# Patient Record
Sex: MALE | Race: WHITE | NOT HISPANIC OR LATINO | URBAN - METROPOLITAN AREA
[De-identification: names, ages, dates, MRNs, and addresses within clinical notes are randomized per-mention and may not be internally consistent; named-entity substitution may affect disease eponyms.]

---

## 2016-12-31 NOTE — ED ADULT TRIAGE NOTE - CHIEF COMPLAINT QUOTE
Pt c/o syncopal episode sp "feeling a weird sensation in his groin area & SOB." States he hit the L side of his head on the floor with unknown LOC - found by mom on the ground. Mild lethargy & hematoma noted to pt's scalp. Family also states incontinence.

## 2016-12-31 NOTE — ED ADULT NURSE NOTE - OBJECTIVE STATEMENT
22 y/o male presents to ED s/p syncopal episode.  Pt states that he has been having "tingling" in his genitals all day and felt a pain in his stomach PTA then woke up on the floor with urinary incontinence.  Pt's mother states that she heard him fall and found him on the floor with his eyes rolled back into his head NO shaking of extremities per mother and when she called his name he was able to focus on her but confused.  Pt arrived in ED awake alert in NAD, c/o slight headache4 equally.  Pt denies drug or alcohol use, states that he does take xanax occasionally last time was 5 days ago and months ago prior to that.  Pt denies daily use.  Pt states that he takes work out supplements daily.

## 2017-01-01 NOTE — ED PROVIDER NOTE - PLAN OF CARE
You were seen today after your faint.  Syncope is very common. About 1 out of every 3 people has it at some point in life. In many cases, syncope is nothing to worry about.  Syncope happens when the brain temporarily doesn't get enough blood. One of the most common reasons this happens is called "vasovagal syncope." If you have vasovagal syncope, your body has a reaction in which your heart beats too slowly or your blood vessels expand (or both). This can happen for lots of different kinds of reasons. People can have vasovagal syncope if they: have stress from pain or fear, stand too long, are tired, or occasionally from urinating, coughing, or other bodily functions.  See your primary care physician in 2-3 days for re-evaluation.  RETURN TO THE EMERGENCY DEPARTMENT IMMEDIATELY IF YOU DEVELOP CHEST PAIN, RACING HEART, TROUBLE BREATHING, OR FOR ANY OTHER CONCERN.

## 2017-01-01 NOTE — ED PROVIDER NOTE - CHPI ED SYMPTOMS NEG
no nausea/no vomiting/no blurred vision/no weakness/no photophobia/no change in level of consciousness

## 2017-01-01 NOTE — ED PROVIDER NOTE - CARE PLAN
Principal Discharge DX:	Syncope, unspecified syncope type Principal Discharge DX:	Syncope, unspecified syncope type  Instructions for follow-up, activity and diet:	You were seen today after your faint.  Syncope is very common. About 1 out of every 3 people has it at some point in life. In many cases, syncope is nothing to worry about.  Syncope happens when the brain temporarily doesn't get enough blood. One of the most common reasons this happens is called "vasovagal syncope." If you have vasovagal syncope, your body has a reaction in which your heart beats too slowly or your blood vessels expand (or both). This can happen for lots of different kinds of reasons. People can have vasovagal syncope if they: have stress from pain or fear, stand too long, are tired, or occasionally from urinating, coughing, or other bodily functions.  See your primary care physician in 2-3 days for re-evaluation.  RETURN TO THE EMERGENCY DEPARTMENT IMMEDIATELY IF YOU DEVELOP CHEST PAIN, RACING HEART, TROUBLE BREATHING, OR FOR ANY OTHER CONCERN.

## 2017-01-01 NOTE — ED PROVIDER NOTE - MEDICAL DECISION MAKING DETAILS
22 y/o male s/p LoC.  More likely syncope as prodrome, no seizure-like activity, no post-ictal confusion.  Did have urinary incontinence and some mild head trauma.  EKG shows no abnormalities to suggest cardiac etiology.  Will check cbc, cmp, ua, ct head.

## 2017-01-01 NOTE — ED PROVIDER NOTE - OBJECTIVE STATEMENT
Pt is a 22 y/o male presenting after syncopal episode.  Per pt he woke up this am, had some dysuria throughout the day.  Was reading about dysuria on his smartphone, becoming anxious, began to feel lightheaded, walked towards his parents in the living room for help and woke up on the floor.  Per mother, she heard a thud and found him on the floor, no seizure-like activity.  He came to on his own after a few minutes, transiently disoriented for less than a minute, then back to baseline ms.  +urinary incont.  Recent URI over past two weeks gradually improving.  Mild ha at left occiput where he struck his head on the floor.  Denies f/c, neck pain, vision changes, numbness/tingling/weakness to arms/legs, gait abn.

## 2018-05-21 NOTE — ED ADULT NURSE REASSESSMENT NOTE - NS ED NURSE REASSESS COMMENT FT1
Pt remains A&Ox3, denies dizziness, lightheadedness, n/v, HA at this time. Pt ambulating back and forth to bathroom with steady gait noted. Md at bedside reviewing d/c paperwork with pt and family, denies any questions or concerns at this time. IV d/c and ambulatory on d/c with family
alert

## 2019-04-02 ENCOUNTER — EMERGENCY (EMERGENCY)
Facility: HOSPITAL | Age: 24
LOS: 0 days | Discharge: HOME | End: 2019-04-02
Attending: EMERGENCY MEDICINE
Payer: COMMERCIAL

## 2019-04-02 VITALS
TEMPERATURE: 98 F | WEIGHT: 244.93 LBS | OXYGEN SATURATION: 98 % | RESPIRATION RATE: 18 BRPM | HEART RATE: 80 BPM | HEIGHT: 74 IN | DIASTOLIC BLOOD PRESSURE: 68 MMHG | SYSTOLIC BLOOD PRESSURE: 109 MMHG

## 2019-04-02 VITALS — TEMPERATURE: 98 F

## 2019-04-02 DIAGNOSIS — Z88.0 ALLERGY STATUS TO PENICILLIN: ICD-10-CM

## 2019-04-02 DIAGNOSIS — R10.9 UNSPECIFIED ABDOMINAL PAIN: ICD-10-CM

## 2019-04-02 DIAGNOSIS — R10.32 LEFT LOWER QUADRANT PAIN: ICD-10-CM

## 2019-04-02 DIAGNOSIS — R10.12 LEFT UPPER QUADRANT PAIN: ICD-10-CM

## 2019-04-02 LAB
ALBUMIN SERPL ELPH-MCNC: 4 G/DL — SIGNIFICANT CHANGE UP (ref 3.3–5)
ALP SERPL-CCNC: 67 U/L — SIGNIFICANT CHANGE UP (ref 40–120)
ALT FLD-CCNC: 33 U/L — SIGNIFICANT CHANGE UP (ref 12–78)
AMYLASE P1 CFR SERPL: 64 U/L — SIGNIFICANT CHANGE UP (ref 25–115)
ANION GAP SERPL CALC-SCNC: 8 MMOL/L — SIGNIFICANT CHANGE UP (ref 5–17)
APTT BLD: 35.2 SEC — SIGNIFICANT CHANGE UP (ref 28.5–37)
AST SERPL-CCNC: 25 U/L — SIGNIFICANT CHANGE UP (ref 15–37)
BASOPHILS # BLD AUTO: 0 K/UL — SIGNIFICANT CHANGE UP (ref 0–0.2)
BASOPHILS NFR BLD AUTO: 0 % — SIGNIFICANT CHANGE UP (ref 0–2)
BILIRUB SERPL-MCNC: 0.6 MG/DL — SIGNIFICANT CHANGE UP (ref 0.2–1.2)
BUN SERPL-MCNC: 10 MG/DL — SIGNIFICANT CHANGE UP (ref 7–23)
CALCIUM SERPL-MCNC: 9.2 MG/DL — SIGNIFICANT CHANGE UP (ref 8.5–10.1)
CHLORIDE SERPL-SCNC: 107 MMOL/L — SIGNIFICANT CHANGE UP (ref 96–108)
CO2 SERPL-SCNC: 25 MMOL/L — SIGNIFICANT CHANGE UP (ref 22–31)
CREAT SERPL-MCNC: 1.09 MG/DL — SIGNIFICANT CHANGE UP (ref 0.5–1.3)
EOSINOPHIL # BLD AUTO: 7.02 K/UL — HIGH (ref 0–0.5)
EOSINOPHIL NFR BLD AUTO: 46 % — HIGH (ref 0–6)
GLUCOSE SERPL-MCNC: 99 MG/DL — SIGNIFICANT CHANGE UP (ref 70–99)
HCT VFR BLD CALC: 49.4 % — SIGNIFICANT CHANGE UP (ref 39–50)
HGB BLD-MCNC: 17 G/DL — SIGNIFICANT CHANGE UP (ref 13–17)
INR BLD: 1.23 RATIO — HIGH (ref 0.88–1.16)
LIDOCAIN IGE QN: 122 U/L — SIGNIFICANT CHANGE UP (ref 73–393)
LYMPHOCYTES # BLD AUTO: 22 % — SIGNIFICANT CHANGE UP (ref 13–44)
LYMPHOCYTES # BLD AUTO: 3.36 K/UL — HIGH (ref 1–3.3)
MANUAL SMEAR VERIFICATION: SIGNIFICANT CHANGE UP
MCHC RBC-ENTMCNC: 29.2 PG — SIGNIFICANT CHANGE UP (ref 27–34)
MCHC RBC-ENTMCNC: 34.4 GM/DL — SIGNIFICANT CHANGE UP (ref 32–36)
MCV RBC AUTO: 84.7 FL — SIGNIFICANT CHANGE UP (ref 80–100)
MONOCYTES # BLD AUTO: 0.31 K/UL — SIGNIFICANT CHANGE UP (ref 0–0.9)
MONOCYTES NFR BLD AUTO: 2 % — SIGNIFICANT CHANGE UP (ref 2–14)
NEUTROPHILS # BLD AUTO: 4.58 K/UL — SIGNIFICANT CHANGE UP (ref 1.8–7.4)
NEUTROPHILS NFR BLD AUTO: 29 % — LOW (ref 43–77)
NEUTS BAND # BLD: 1 % — SIGNIFICANT CHANGE UP (ref 0–8)
NRBC # BLD: 0 /100 — SIGNIFICANT CHANGE UP (ref 0–0)
NRBC # BLD: SIGNIFICANT CHANGE UP /100 WBCS (ref 0–0)
PLAT MORPH BLD: NORMAL — SIGNIFICANT CHANGE UP
PLATELET # BLD AUTO: 331 K/UL — SIGNIFICANT CHANGE UP (ref 150–400)
POTASSIUM SERPL-MCNC: 4.1 MMOL/L — SIGNIFICANT CHANGE UP (ref 3.5–5.3)
POTASSIUM SERPL-SCNC: 4.1 MMOL/L — SIGNIFICANT CHANGE UP (ref 3.5–5.3)
PROT SERPL-MCNC: 7.9 GM/DL — SIGNIFICANT CHANGE UP (ref 6–8.3)
PROTHROM AB SERPL-ACNC: 13.9 SEC — HIGH (ref 10–12.9)
RBC # BLD: 5.83 M/UL — HIGH (ref 4.2–5.8)
RBC # FLD: 13 % — SIGNIFICANT CHANGE UP (ref 10.3–14.5)
RBC BLD AUTO: NORMAL — SIGNIFICANT CHANGE UP
SODIUM SERPL-SCNC: 140 MMOL/L — SIGNIFICANT CHANGE UP (ref 135–145)
WBC # BLD: 15.26 K/UL — HIGH (ref 3.8–10.5)
WBC # FLD AUTO: 15.26 K/UL — HIGH (ref 3.8–10.5)

## 2019-04-02 PROCEDURE — 74177 CT ABD & PELVIS W/CONTRAST: CPT | Mod: 26

## 2019-04-02 PROCEDURE — 71045 X-RAY EXAM CHEST 1 VIEW: CPT | Mod: 26

## 2019-04-02 PROCEDURE — 99285 EMERGENCY DEPT VISIT HI MDM: CPT

## 2019-04-02 RX ORDER — MORPHINE SULFATE 50 MG/1
4 CAPSULE, EXTENDED RELEASE ORAL ONCE
Qty: 0 | Refills: 0 | Status: DISCONTINUED | OUTPATIENT
Start: 2019-04-02 | End: 2019-04-02

## 2019-04-02 RX ORDER — OMEPRAZOLE 10 MG/1
1 CAPSULE, DELAYED RELEASE ORAL
Qty: 0 | Refills: 0 | COMMUNITY

## 2019-04-02 RX ORDER — HYDROMORPHONE HYDROCHLORIDE 2 MG/ML
0.5 INJECTION INTRAMUSCULAR; INTRAVENOUS; SUBCUTANEOUS ONCE
Qty: 0 | Refills: 0 | Status: DISCONTINUED | OUTPATIENT
Start: 2019-04-02 | End: 2019-04-02

## 2019-04-02 RX ORDER — ONDANSETRON 8 MG/1
4 TABLET, FILM COATED ORAL ONCE
Qty: 0 | Refills: 0 | Status: COMPLETED | OUTPATIENT
Start: 2019-04-02 | End: 2019-04-02

## 2019-04-02 RX ORDER — SODIUM CHLORIDE 9 MG/ML
2000 INJECTION INTRAMUSCULAR; INTRAVENOUS; SUBCUTANEOUS ONCE
Qty: 0 | Refills: 0 | Status: COMPLETED | OUTPATIENT
Start: 2019-04-02 | End: 2019-04-02

## 2019-04-02 RX ORDER — PANTOPRAZOLE SODIUM 20 MG/1
40 TABLET, DELAYED RELEASE ORAL ONCE
Qty: 0 | Refills: 0 | Status: COMPLETED | OUTPATIENT
Start: 2019-04-02 | End: 2019-04-02

## 2019-04-02 RX ORDER — IOHEXOL 300 MG/ML
30 INJECTION, SOLUTION INTRAVENOUS ONCE
Qty: 0 | Refills: 0 | Status: COMPLETED | OUTPATIENT
Start: 2019-04-02 | End: 2019-04-02

## 2019-04-02 RX ADMIN — MORPHINE SULFATE 4 MILLIGRAM(S): 50 CAPSULE, EXTENDED RELEASE ORAL at 18:04

## 2019-04-02 RX ADMIN — SODIUM CHLORIDE 2000 MILLILITER(S): 9 INJECTION INTRAMUSCULAR; INTRAVENOUS; SUBCUTANEOUS at 17:41

## 2019-04-02 RX ADMIN — IOHEXOL 30 MILLILITER(S): 300 INJECTION, SOLUTION INTRAVENOUS at 17:43

## 2019-04-02 RX ADMIN — MORPHINE SULFATE 4 MILLIGRAM(S): 50 CAPSULE, EXTENDED RELEASE ORAL at 19:41

## 2019-04-02 RX ADMIN — PANTOPRAZOLE SODIUM 40 MILLIGRAM(S): 20 TABLET, DELAYED RELEASE ORAL at 17:40

## 2019-04-02 RX ADMIN — HYDROMORPHONE HYDROCHLORIDE 0.5 MILLIGRAM(S): 2 INJECTION INTRAMUSCULAR; INTRAVENOUS; SUBCUTANEOUS at 20:45

## 2019-04-02 RX ADMIN — ONDANSETRON 4 MILLIGRAM(S): 8 TABLET, FILM COATED ORAL at 19:41

## 2019-04-02 RX ADMIN — ONDANSETRON 4 MILLIGRAM(S): 8 TABLET, FILM COATED ORAL at 17:40

## 2019-04-02 NOTE — ED PROVIDER NOTE - PROGRESS NOTE DETAILS
Justin: patient signed out by Dr. Guerra pending CT and re-eval. CT reviewed and discussed with patient. Patient received pain mgmt. He currently feels well and wants to go home. Patient will call Dr. Dawkins in AM for f/up. d/c with care instructions.

## 2019-04-02 NOTE — ED ADULT TRIAGE NOTE - CHIEF COMPLAINT QUOTE
c/o luq pain since last wednesday worse since last night seen at [facility] yesterday and rx'd carafate and dexilant started meds last night no improvement c/o nausea denies vomiting states diarrhea last week now regular consistency stools

## 2019-04-02 NOTE — ED PROVIDER NOTE - OBJECTIVE STATEMENT
23 years old male walked in with mom c/o left upper abd pain nausea diarrhea for one week Pt was seen by Dr. Dawkins gastroenterologist couple of days and and started carafate and dexilant and scheduled for upper endoscopy in 3 wks. Pt sts the diarrhea resolved but the pain worsen since last night. Pt denies recent hx of trauma, headache, dizziness, blurred visions, light sensitivities, cough, sob, chest pain, fever, chills, dysuria.

## 2019-04-02 NOTE — ED ADULT NURSE NOTE - OBJECTIVE STATEMENT
Patient comes in complaining of LUQ abdominal pain, with N/V associated and diarrhea with a foul smell. PMH: Acid Reflux

## 2019-04-02 NOTE — ED PROVIDER NOTE - CONSTITUTIONAL, MLM
normal... Well appearing, well nourished, awake, alert, oriented to person, place, time/situation and in no apparent distress. Speaking in clear full sentences no nasal flaring no shoulders retractions no diaphoresis, not holding his head/chest/abdomen.

## 2019-04-02 NOTE — ED ADULT NURSE NOTE - ED STAT RN HANDOFF DETAILS
Patient lying comfortably on stretcher. No signs of distressed noted. Mother by the bedside. Handoff given to STAS Land.

## 2020-04-25 ENCOUNTER — MESSAGE (OUTPATIENT)
Age: 25
End: 2020-04-25

## 2021-11-11 PROBLEM — K21.9 GASTRO-ESOPHAGEAL REFLUX DISEASE WITHOUT ESOPHAGITIS: Chronic | Status: ACTIVE | Noted: 2019-04-02

## 2022-01-05 ENCOUNTER — APPOINTMENT (OUTPATIENT)
Dept: ENDOCRINOLOGY | Facility: CLINIC | Age: 27
End: 2022-01-05
Payer: COMMERCIAL

## 2022-01-05 VITALS
BODY MASS INDEX: 30.8 KG/M2 | DIASTOLIC BLOOD PRESSURE: 87 MMHG | HEART RATE: 79 BPM | SYSTOLIC BLOOD PRESSURE: 139 MMHG | OXYGEN SATURATION: 99 % | HEIGHT: 74 IN | WEIGHT: 240 LBS

## 2022-01-05 PROCEDURE — 99204 OFFICE O/P NEW MOD 45 MIN: CPT | Mod: 25

## 2022-01-08 ENCOUNTER — LABORATORY RESULT (OUTPATIENT)
Age: 27
End: 2022-01-08

## 2022-02-05 ENCOUNTER — LABORATORY RESULT (OUTPATIENT)
Age: 27
End: 2022-02-05

## 2022-02-09 LAB
MONOMERIC PROLACTIN (ICMA)*: 15.2 NG/ML
PERCENT MACROPROLACTIN: 22 %
PROLACTIN SERPL-MCNC: 19.5 NG/ML
PROLACTIN, SERUM (ICMA)*: 19.5 NG/ML

## 2022-02-22 ENCOUNTER — APPOINTMENT (OUTPATIENT)
Dept: MRI IMAGING | Facility: CLINIC | Age: 27
End: 2022-02-22
Payer: COMMERCIAL

## 2022-02-22 ENCOUNTER — OUTPATIENT (OUTPATIENT)
Dept: OUTPATIENT SERVICES | Facility: HOSPITAL | Age: 27
LOS: 1 days | End: 2022-02-22
Payer: COMMERCIAL

## 2022-02-22 DIAGNOSIS — R79.89 OTHER SPECIFIED ABNORMAL FINDINGS OF BLOOD CHEMISTRY: ICD-10-CM

## 2022-02-22 PROCEDURE — A9585: CPT

## 2022-02-22 PROCEDURE — 70553 MRI BRAIN STEM W/O & W/DYE: CPT | Mod: 26

## 2022-02-22 PROCEDURE — 70553 MRI BRAIN STEM W/O & W/DYE: CPT

## 2022-04-14 ENCOUNTER — OUTPATIENT (OUTPATIENT)
Dept: OUTPATIENT SERVICES | Facility: HOSPITAL | Age: 27
LOS: 1 days | End: 2022-04-14
Payer: COMMERCIAL

## 2022-04-14 ENCOUNTER — APPOINTMENT (OUTPATIENT)
Dept: MRI IMAGING | Facility: CLINIC | Age: 27
End: 2022-04-14
Payer: COMMERCIAL

## 2022-04-14 DIAGNOSIS — Z00.00 ENCOUNTER FOR GENERAL ADULT MEDICAL EXAMINATION WITHOUT ABNORMAL FINDINGS: ICD-10-CM

## 2022-04-14 PROCEDURE — 72148 MRI LUMBAR SPINE W/O DYE: CPT | Mod: 26

## 2022-04-14 PROCEDURE — 72148 MRI LUMBAR SPINE W/O DYE: CPT

## 2022-04-20 ENCOUNTER — APPOINTMENT (OUTPATIENT)
Dept: ENDOCRINOLOGY | Facility: CLINIC | Age: 27
End: 2022-04-20
Payer: COMMERCIAL

## 2022-04-20 VITALS
DIASTOLIC BLOOD PRESSURE: 70 MMHG | HEIGHT: 74 IN | OXYGEN SATURATION: 98 % | SYSTOLIC BLOOD PRESSURE: 118 MMHG | BODY MASS INDEX: 30.03 KG/M2 | TEMPERATURE: 98.4 F | WEIGHT: 234 LBS | HEART RATE: 91 BPM

## 2022-04-20 DIAGNOSIS — R79.89 OTHER SPECIFIED ABNORMAL FINDINGS OF BLOOD CHEMISTRY: ICD-10-CM

## 2022-04-20 PROCEDURE — 99215 OFFICE O/P EST HI 40 MIN: CPT

## 2022-04-20 NOTE — HISTORY OF PRESENT ILLNESS
[FreeTextEntry1] : 26 year  Male referred for low testosterone levels. \par Mr Yovany reports a history of scoliosis, herniated discs at age 21 likely due to lifting weights (gets trigger point every month and epidural steroid shots 3x yearly), last injection past summer. He did notice some weight gain (about 20 lbs since stopping working out several years ago), fatigue. His erections are fine, but spontaneous am erections are somewhat decreased. Shaving is well preserved, but "hair growth was never great". Libido is preserved. On and off headaches, but denies visual disturbances, dizziness, nausea, vomiting, abdominal pain,  breast discharge, worsening in peripheral vision or excessive snoring. Sedentary job. \par He underwent puberty at a normal rate compared with his peers. No history of learning disabilities or behavioral disorders. No history of insults to the brain or testes, including surgery, trauma, tumors, or radiation exposure. \par No history of diabetes, HIV, liver disease, or kidney disease. No history of medication use including anabolic steroids, glucocorticoids, chronic pain medications, or history of chemotherapy. His sense of smell is intact. \par Family history is negative for infertility problems and low testosterone. Currently he is in monogamous relationship with one partner. No issues with ejaculation.  \par Labs: prolactin- 19.5 <-- 24.1, but monomeric is 15.2. \par Testo- 222, free T- 9.1, SHBG- 14.5. LH/FSH- 8.8/3.6\par hcg < 1\par estradiol- 9\par TSH- 1.4\par Pit MRI (2/22/22)-  normal\par

## 2022-04-20 NOTE — ASSESSMENT
[FreeTextEntry1] : 1.  Hypogonadism.\par Discussed with the patient in details current approaches to hypogonadism management. \par It is likely multifactorial in etiology , including prior therapy with steroid injections, weight gain.\par -His free testosterone was low normal, and total testosterone was decreased due to low SHBG.\par - will repeat fasting testosterone, gonadotropins, prolactin.\par - R+B of testosterone replacement reviewed in details, including worsening of NII and potential adverse effects on CV system, effect on hematocrit, PSA, and liver functions, as well as decrease in sperm count.\par - I reviewed with the patient topical vs injectable vs implantable testosterone preparations, as well as proper use/applications/precautions and monitoring.\par - Potential options for a short term use of Clomid reviewed as well.\par \par 2.  Mild hyperprolactinemia with normal pituitary MRI.\par – Previous monomeric prolactin is in normal range.  We will repeat fasting prolactin, monomeric prolactin, diluted prolactin levels.\par -Trial of the dopamine agonist can be also considered\par \par We will review the results over the phone and decide on further management

## 2022-04-20 NOTE — REASON FOR VISIT
[Consultation] : a consultation visit [Hypogonadism] : hypogonadism [Pituitary Evaluation/ Disorder] : pituitary evaluation/disorder

## 2022-05-06 ENCOUNTER — APPOINTMENT (OUTPATIENT)
Dept: ENDOCRINOLOGY | Facility: CLINIC | Age: 27
End: 2022-05-06

## 2022-06-23 ENCOUNTER — APPOINTMENT (OUTPATIENT)
Dept: CT IMAGING | Facility: CLINIC | Age: 27
End: 2022-06-23

## 2022-06-23 ENCOUNTER — OUTPATIENT (OUTPATIENT)
Dept: OUTPATIENT SERVICES | Facility: HOSPITAL | Age: 27
LOS: 1 days | End: 2022-06-23
Payer: COMMERCIAL

## 2022-06-23 DIAGNOSIS — Z00.00 ENCOUNTER FOR GENERAL ADULT MEDICAL EXAMINATION WITHOUT ABNORMAL FINDINGS: ICD-10-CM

## 2022-06-23 DIAGNOSIS — J32.8 OTHER CHRONIC SINUSITIS: ICD-10-CM

## 2022-06-23 PROCEDURE — 70486 CT MAXILLOFACIAL W/O DYE: CPT | Mod: 26

## 2022-06-23 PROCEDURE — 70486 CT MAXILLOFACIAL W/O DYE: CPT

## 2022-09-20 ENCOUNTER — OUTPATIENT (OUTPATIENT)
Dept: OUTPATIENT SERVICES | Facility: HOSPITAL | Age: 27
LOS: 1 days | End: 2022-09-20
Payer: COMMERCIAL

## 2022-09-20 ENCOUNTER — APPOINTMENT (OUTPATIENT)
Age: 27
End: 2022-09-20

## 2022-09-20 DIAGNOSIS — M79.12 MYALGIA OF AUXILIARY MUSCLES, HEAD AND NECK: ICD-10-CM

## 2022-09-20 DIAGNOSIS — R63.4 ABNORMAL WEIGHT LOSS: ICD-10-CM

## 2022-09-20 DIAGNOSIS — R59.1 GENERALIZED ENLARGED LYMPH NODES: ICD-10-CM

## 2022-09-20 PROCEDURE — 76536 US EXAM OF HEAD AND NECK: CPT | Mod: 26

## 2022-09-20 PROCEDURE — 76536 US EXAM OF HEAD AND NECK: CPT

## 2022-09-26 ENCOUNTER — APPOINTMENT (OUTPATIENT)
Dept: INTERNAL MEDICINE | Facility: CLINIC | Age: 27
End: 2022-09-26

## 2022-09-29 ENCOUNTER — NON-APPOINTMENT (OUTPATIENT)
Age: 27
End: 2022-09-29

## 2022-09-30 ENCOUNTER — APPOINTMENT (OUTPATIENT)
Dept: ENDOCRINOLOGY | Facility: CLINIC | Age: 27
End: 2022-09-30

## 2022-09-30 DIAGNOSIS — E04.1 NONTOXIC SINGLE THYROID NODULE: ICD-10-CM

## 2022-09-30 PROCEDURE — 99214 OFFICE O/P EST MOD 30 MIN: CPT | Mod: 95

## 2022-10-12 ENCOUNTER — APPOINTMENT (OUTPATIENT)
Dept: SURGERY | Facility: CLINIC | Age: 27
End: 2022-10-12

## 2022-10-13 ENCOUNTER — APPOINTMENT (OUTPATIENT)
Dept: ULTRASOUND IMAGING | Facility: CLINIC | Age: 27
End: 2022-10-13

## 2022-10-13 ENCOUNTER — OUTPATIENT (OUTPATIENT)
Dept: OUTPATIENT SERVICES | Facility: HOSPITAL | Age: 27
LOS: 1 days | End: 2022-10-13

## 2022-10-13 DIAGNOSIS — E04.1 NONTOXIC SINGLE THYROID NODULE: ICD-10-CM

## 2022-11-22 ENCOUNTER — APPOINTMENT (OUTPATIENT)
Dept: OTOLARYNGOLOGY | Facility: CLINIC | Age: 27
End: 2022-11-22

## 2023-03-08 ENCOUNTER — APPOINTMENT (OUTPATIENT)
Dept: ENDOCRINOLOGY | Facility: CLINIC | Age: 28
End: 2023-03-08

## 2023-03-31 ENCOUNTER — NON-APPOINTMENT (OUTPATIENT)
Age: 28
End: 2023-03-31

## 2023-04-12 ENCOUNTER — APPOINTMENT (OUTPATIENT)
Dept: OTOLARYNGOLOGY | Facility: CLINIC | Age: 28
End: 2023-04-12
Payer: COMMERCIAL

## 2023-04-12 VITALS
SYSTOLIC BLOOD PRESSURE: 120 MMHG | HEIGHT: 74 IN | HEART RATE: 91 BPM | WEIGHT: 220 LBS | BODY MASS INDEX: 28.23 KG/M2 | DIASTOLIC BLOOD PRESSURE: 82 MMHG

## 2023-04-12 DIAGNOSIS — Z78.9 OTHER SPECIFIED HEALTH STATUS: ICD-10-CM

## 2023-04-12 PROCEDURE — 31231 NASAL ENDOSCOPY DX: CPT

## 2023-04-12 RX ORDER — FEXOFENADINE HCL 60 MG
CAPSULE ORAL
Refills: 0 | Status: ACTIVE | COMMUNITY

## 2023-04-12 RX ORDER — ACETAMINOPHEN 80 MG
2300-700 TABLET,CHEWABLE ORAL
Qty: 1 | Refills: 0 | Status: ACTIVE | COMMUNITY
Start: 2023-04-12 | End: 1900-01-01

## 2023-04-12 RX ORDER — OMEPRAZOLE 40 MG/1
40 CAPSULE, DELAYED RELEASE ORAL
Refills: 0 | Status: ACTIVE | COMMUNITY

## 2023-04-12 RX ORDER — SOD CHLOR,BICARB/SQUEEZ BOTTLE
PACKET, WITH RINSE DEVICE NASAL
Qty: 30 | Refills: 3 | Status: ACTIVE | COMMUNITY
Start: 2023-04-12 | End: 1900-01-01

## 2023-04-12 RX ORDER — MUPIROCIN 20 MG/G
2 OINTMENT TOPICAL
Qty: 1 | Refills: 2 | Status: ACTIVE | COMMUNITY
Start: 2023-04-12 | End: 1900-01-01

## 2023-04-12 NOTE — ASSESSMENT
[FreeTextEntry1] : Assessment/Plan: \par #1 Subacute rhinosinusitis\par #2 Large septal perforation\par #3 Cocaine use\par \par I have recommended the patient stop using all cocaine.  He is in agreement with this.  We will start him on Isidro med rinses daily.  He is to start a 10 day course of Levofloxacin.  He is to avoid exercise while on that med.  He will start applying bactroban to the inside of his nostrils tid for at least 1 month.  He is to start using a humidifier at bedtime.  I have ordered him for a CT sinus without contrast. I also took a culture from the cavity of his septal perf and will follow up results.  Lastly I have ordered him for follow up with either Dr. Duque or Dr. Louis.\par \par

## 2023-04-12 NOTE — PHYSICAL EXAM
[Nasal Endoscopy Performed] : nasal endoscopy was performed, see procedure section for findings [Normal] : mucosa is normal [Midline] : trachea located in midline position [de-identified] : Erythematous cursting through both nares

## 2023-04-12 NOTE — REASON FOR VISIT
[Initial Evaluation] : an initial evaluation for [Parent] : parent [FreeTextEntry2] : sinus pain and pressure and swelling.

## 2023-04-12 NOTE — REVIEW OF SYSTEMS
[As Noted in HPI] : as noted in HPI [Nasal Congestion] : nasal congestion [Nose Bleeds] : nose bleeds [Recurrent Sinus Infections] : recurrent sinus infections [Sinus Pressure] : sinus pressure

## 2023-04-12 NOTE — HISTORY OF PRESENT ILLNESS
[de-identified] : EUGENIA RIVERA is a 27 year old man who presents to the [facility] with severe nasal congestion for over 1 month.\par States nasal congestion, sinus pain/pressure, post nasal drip, white nasal discharge.\par Reports dryness in the nose with mild epistaxis. \par Reports 4 sinus infection in the past year, treated with antibiotics, just completed cefdinir yesterday. \par No prior flonase.  Uses nasal saline spray but no rinses.\par Admits to frequent intranasal cocaine usage for the past 1 year doing it previously daily and now down to approx twice a week.  No other drug use.  \par \par CT sinus performed on 6/23/22 and reviewed by myself shows:\par Normally aerated and pneumatized sinuses without evidence of any sinus disease.

## 2023-04-12 NOTE — PROCEDURE
[FreeTextEntry6] : NASAL ENDOSCOPY: Following administration of numbing and decongestant sprays, the nasal cavities were carefully inspected with a flexible endoscope. There is a large septal perforation with entirety of nasal cavity bilaterally in anterior half covered in white adherent exudate.  Turbinates could not be easily visualized due to this exudate.  Posterior 1/3 of nasal cavity bilaterally without masses or lesions. Nasopharynx with scant white exudate.

## 2023-04-13 ENCOUNTER — NON-APPOINTMENT (OUTPATIENT)
Age: 28
End: 2023-04-13

## 2023-04-13 ENCOUNTER — APPOINTMENT (OUTPATIENT)
Dept: CT IMAGING | Facility: IMAGING CENTER | Age: 28
End: 2023-04-13
Payer: COMMERCIAL

## 2023-04-13 ENCOUNTER — OUTPATIENT (OUTPATIENT)
Dept: OUTPATIENT SERVICES | Facility: HOSPITAL | Age: 28
LOS: 1 days | End: 2023-04-13
Payer: COMMERCIAL

## 2023-04-13 DIAGNOSIS — J01.90 ACUTE SINUSITIS, UNSPECIFIED: ICD-10-CM

## 2023-04-13 PROCEDURE — 70486 CT MAXILLOFACIAL W/O DYE: CPT | Mod: 26

## 2023-04-13 PROCEDURE — 70486 CT MAXILLOFACIAL W/O DYE: CPT

## 2023-04-18 LAB — EAR NOSE AND THROAT CULTURE: NORMAL

## 2023-04-27 ENCOUNTER — APPOINTMENT (OUTPATIENT)
Dept: OTOLARYNGOLOGY | Facility: CLINIC | Age: 28
End: 2023-04-27
Payer: COMMERCIAL

## 2023-04-27 VITALS
HEART RATE: 92 BPM | BODY MASS INDEX: 28.23 KG/M2 | WEIGHT: 220 LBS | HEIGHT: 74 IN | SYSTOLIC BLOOD PRESSURE: 120 MMHG | DIASTOLIC BLOOD PRESSURE: 77 MMHG

## 2023-04-27 DIAGNOSIS — B96.89 ACUTE SINUSITIS, UNSPECIFIED: ICD-10-CM

## 2023-04-27 DIAGNOSIS — J01.90 ACUTE SINUSITIS, UNSPECIFIED: ICD-10-CM

## 2023-04-27 PROCEDURE — 31231 NASAL ENDOSCOPY DX: CPT

## 2023-04-27 PROCEDURE — 99214 OFFICE O/P EST MOD 30 MIN: CPT | Mod: 25

## 2023-04-27 NOTE — HISTORY OF PRESENT ILLNESS
[de-identified] : 27 year old male presents for evaluation of a septal perforation\par Referred by Dr. Bassett\par Hx cocaine abuse, previously was using frequently now down to 2x/week\par States completed course of Levaquin 4/22/23 with some mild improvement\par Several mo of severe nasal congestion, pain, rhinorrhea and PND, facial pain and pressure\par Sense of smell is good when he's not congested.\par States uses nasal rinses and sprays 2x daily and using mupirocin for dryness in nose. \par \par CT sinuses 4/13/23-- reviewed personally-- IMPRESSION:\par 1.  Rightward nasal septal deviation\par 2. Anterior inferior septal lysis\par 3. Mild chronic sinusitis\par \par Symptoms: Reports/denies nasal c

## 2023-06-14 ENCOUNTER — APPOINTMENT (OUTPATIENT)
Dept: OTOLARYNGOLOGY | Facility: CLINIC | Age: 28
End: 2023-06-14
Payer: COMMERCIAL

## 2023-06-14 VITALS
TEMPERATURE: 98.6 F | HEIGHT: 74 IN | BODY MASS INDEX: 28.23 KG/M2 | DIASTOLIC BLOOD PRESSURE: 84 MMHG | HEART RATE: 80 BPM | SYSTOLIC BLOOD PRESSURE: 121 MMHG | OXYGEN SATURATION: 97 % | WEIGHT: 220 LBS

## 2023-06-14 PROCEDURE — 31231 NASAL ENDOSCOPY DX: CPT

## 2023-06-14 PROCEDURE — 99214 OFFICE O/P EST MOD 30 MIN: CPT | Mod: 25

## 2023-06-14 NOTE — REASON FOR VISIT
[Subsequent Evaluation] : a subsequent evaluation for [FreeTextEntry2] : septal perforation and acute bacterial rhinosinusitis

## 2023-06-14 NOTE — HISTORY OF PRESENT ILLNESS
[de-identified] : 27 year old male with hx of cocaine abuse presents for follow-up of septal perforation and acute bacterial rhinosinusitis.\par LCV 04/27/2023  at which time was started on Prednisone--completed and Mometasone rinses--doing 2x daily \par Also reports use of Aquaphor ointment.\par Reports mild nasal congestion, intermittent clear/blood-tinged anterior rhinorrhea and post nasal drip with green/brown phlegm production.\par Denies facial pain and pressure\par Sense of smell is okay\par Reports use of Allegra-D and saline spray PRN.\par No current cocaine use

## 2023-06-22 LAB — CORE LAB BIOPSY: NORMAL

## 2023-09-13 ENCOUNTER — APPOINTMENT (OUTPATIENT)
Dept: OTOLARYNGOLOGY | Facility: CLINIC | Age: 28
End: 2023-09-13
Payer: MEDICAID

## 2023-09-13 VITALS
WEIGHT: 235 LBS | HEIGHT: 74 IN | HEART RATE: 74 BPM | SYSTOLIC BLOOD PRESSURE: 116 MMHG | DIASTOLIC BLOOD PRESSURE: 80 MMHG | BODY MASS INDEX: 30.16 KG/M2 | OXYGEN SATURATION: 98 %

## 2023-09-13 DIAGNOSIS — M95.0 ACQUIRED DEFORMITY OF NOSE: ICD-10-CM

## 2023-09-13 DIAGNOSIS — R04.0 EPISTAXIS: ICD-10-CM

## 2023-09-13 DIAGNOSIS — J34.89 OTHER SPECIFIED DISORDERS OF NOSE AND NASAL SINUSES: ICD-10-CM

## 2023-09-13 PROCEDURE — 31231 NASAL ENDOSCOPY DX: CPT

## 2023-11-02 ENCOUNTER — APPOINTMENT (OUTPATIENT)
Dept: OTOLARYNGOLOGY | Facility: CLINIC | Age: 28
End: 2023-11-02
Payer: MEDICAID

## 2023-11-02 VITALS
SYSTOLIC BLOOD PRESSURE: 117 MMHG | BODY MASS INDEX: 30.8 KG/M2 | HEART RATE: 66 BPM | HEIGHT: 74 IN | WEIGHT: 240 LBS | DIASTOLIC BLOOD PRESSURE: 83 MMHG

## 2023-11-02 DIAGNOSIS — J34.89 OTHER SPECIFIED DISORDERS OF NOSE AND NASAL SINUSES: ICD-10-CM

## 2023-11-02 DIAGNOSIS — F14.10 COCAINE ABUSE, UNCOMPLICATED: ICD-10-CM

## 2023-11-02 PROCEDURE — 31231 NASAL ENDOSCOPY DX: CPT

## 2023-11-02 PROCEDURE — 99213 OFFICE O/P EST LOW 20 MIN: CPT | Mod: 25

## 2023-11-02 RX ORDER — PREDNISONE 10 MG/1
10 TABLET ORAL
Qty: 30 | Refills: 0 | Status: DISCONTINUED | COMMUNITY
Start: 2023-04-27 | End: 2023-11-02

## 2023-11-02 RX ORDER — MOMETASONE FUROATE 100 %
POWDER (GRAM) MISCELLANEOUS
Qty: 1 | Refills: 3 | Status: DISCONTINUED | COMMUNITY
Start: 2023-04-27 | End: 2023-11-02

## 2023-11-02 RX ORDER — LEVOFLOXACIN 500 MG/1
500 TABLET, FILM COATED ORAL DAILY
Qty: 10 | Refills: 0 | Status: DISCONTINUED | COMMUNITY
Start: 2023-04-12 | End: 2023-11-02

## 2023-11-03 PROBLEM — J34.89 NASAL CRUSTING: Status: ACTIVE | Noted: 2023-04-12

## 2023-11-03 PROBLEM — F14.10 COCAINE ABUSE: Status: ACTIVE | Noted: 2023-04-27
